# Patient Record
Sex: MALE | Race: WHITE
[De-identification: names, ages, dates, MRNs, and addresses within clinical notes are randomized per-mention and may not be internally consistent; named-entity substitution may affect disease eponyms.]

---

## 2022-02-14 ENCOUNTER — HOSPITAL ENCOUNTER (OUTPATIENT)
Dept: HOSPITAL 75 - PREOP | Age: 47
Discharge: HOME | End: 2022-02-14
Attending: SURGERY
Payer: COMMERCIAL

## 2022-02-14 VITALS — WEIGHT: 264.55 LBS | BODY MASS INDEX: 37.87 KG/M2 | HEIGHT: 70.08 IN

## 2022-02-14 DIAGNOSIS — Z01.818: Primary | ICD-10-CM

## 2022-02-21 ENCOUNTER — HOSPITAL ENCOUNTER (OUTPATIENT)
Dept: HOSPITAL 75 - ENDO | Age: 47
Discharge: HOME | End: 2022-02-21
Attending: SURGERY
Payer: COMMERCIAL

## 2022-02-21 VITALS — SYSTOLIC BLOOD PRESSURE: 133 MMHG | DIASTOLIC BLOOD PRESSURE: 93 MMHG

## 2022-02-21 VITALS — SYSTOLIC BLOOD PRESSURE: 123 MMHG | DIASTOLIC BLOOD PRESSURE: 72 MMHG

## 2022-02-21 VITALS — DIASTOLIC BLOOD PRESSURE: 87 MMHG | SYSTOLIC BLOOD PRESSURE: 121 MMHG

## 2022-02-21 VITALS — DIASTOLIC BLOOD PRESSURE: 103 MMHG | SYSTOLIC BLOOD PRESSURE: 144 MMHG

## 2022-02-21 VITALS — HEIGHT: 70.08 IN | WEIGHT: 264.55 LBS | BODY MASS INDEX: 37.87 KG/M2

## 2022-02-21 VITALS — SYSTOLIC BLOOD PRESSURE: 129 MMHG | DIASTOLIC BLOOD PRESSURE: 76 MMHG

## 2022-02-21 VITALS — SYSTOLIC BLOOD PRESSURE: 121 MMHG | DIASTOLIC BLOOD PRESSURE: 87 MMHG

## 2022-02-21 DIAGNOSIS — Z90.49: ICD-10-CM

## 2022-02-21 DIAGNOSIS — K52.9: ICD-10-CM

## 2022-02-21 DIAGNOSIS — K63.5: ICD-10-CM

## 2022-02-21 DIAGNOSIS — E66.01: ICD-10-CM

## 2022-02-21 DIAGNOSIS — Z12.11: Primary | ICD-10-CM

## 2022-02-21 DIAGNOSIS — K64.8: ICD-10-CM

## 2022-02-21 PROCEDURE — 88305 TISSUE EXAM BY PATHOLOGIST: CPT

## 2022-02-21 NOTE — ANESTHESIA-GENERAL POST-OP
MAC


Patient Condition


Mental Status/LOC:  Same as Preop


Cardiovascular:  Satisfactory


Nausea/Vomiting:  Absent


Respiratory:  Satisfactory


Pain:  Controlled


Complications:  Absent





Post Op Complications


Complications


None





Follow Up Care/Instructions


Patient Instructions


None needed.





Anesthesiology Discharge Order


Discharge Order


Patient is doing well, no complaints, stable vital signs, no apparent adverse 

anesthesia problems.   


No complications reported per nursing.











KORIN HOANG CRNA         Feb 21, 2022 12:36

## 2022-02-21 NOTE — PROGRESS NOTE-PRE OPERATIVE
Pre-Operative Progress Note


H&P Reviewed


The H&P was reviewed, patient examined and no changes noted.


Time Seen by Provider:  08:17


Date H&P Reviewed:  Feb 21, 2022


Time H&P Reviewed:  08:17


Pre-Operative Diagnosis:  Screening colonoscopy











HOOD ANDREWS DO               Feb 21, 2022 08:22

## 2022-02-21 NOTE — ENDOSCOPY DISCHARGE INSTRUCT
Endo Procedure/Findings


Findings


1.:  Polyp


2.:  Internal Hemorrhoids





Discharge Instructions


-


Activity: You might feel a little sleepy until tomorrow.  This is due to the 

medicine you received to relax you.





Until tomorrow, you should:  


   NOT drive a car, operate machinery or power tools.


   NOT drink any alcoholic beverages.


   NOT make any important decisions or sign importortant papers.





Do not return to work until tomorrow, unless otherwise instructed. Resume 

previous activities tomorrow.





Diet: Start by taking liquids.  If you tolerate liquids, advance to solid food.


1.:  Colonscopy in 5 years





Notify Physician


-


If you experience excessive bleeding, unusual abdominal pain, fever, or chest 

pain, contact your doctor immediately.











HOOD ANDREWS DO               Feb 21, 2022 10:21

## 2022-02-21 NOTE — PROGRESS NOTE-POST OPERATIVE
Post-Operative Progess Note


Surgeon (s)/Assistant (s)


Surgeon


HOOD ANDREWS DO


Assistant:  HIGINIO Haile





Pre-Operative Diagnosis


Screening colonoscopy





Post-Operative Diagnosis





Polyp


int hemorrhoids





Procedure & Operative Findings


Date of Procedure


2/21/22


Procedure Performed/Findings


Colon with hot bx





PROCEDURE NOTE:


After informed consent was obtained, the patient was brought to the endoscopy


suite, placed in bed in left lateral decubitus position.  He  was administered


IV sedation by the CRNA who then monitored his vitals the entire time, heart


rate, blood pressure and pulse ox and the scope was inserted, pushed all the way


to about 150 cm and pushed into the cecum.  I took a picture of appendiceal 

orifice 


and noted the ileo-cecal valve.  Then slowly withdrew the scope insufflating to 

look


circumferentially at the walls starting in the cecum, up the ascending colon to 

the 


hepatic flexure, then down the transverse colon to the splenic flexure and  into

the 


descending colon.  Next down in  the sigmoid colon, I found a small polyp and


elected to do a hot biopsy to remove it.  Finally into the rectal vault and 

retroflexed 


the scope. Took a picture of the internal hemorrhoids.  





The patient tolerated the procedure.  He was recovered in endoscopy suite.


Anesthesia Type


IV sedation by CRNA





Estimated Blood Loss


Estimated blood loss (mL):  scant





Specimens/Packing


Specimens Removed


simgoid polyp











HOOD ANDREWS DO               Feb 21, 2022 10:21

## 2022-11-07 ENCOUNTER — HOSPITAL ENCOUNTER (EMERGENCY)
Dept: HOSPITAL 75 - ER | Age: 47
Discharge: HOME | End: 2022-11-07
Payer: COMMERCIAL

## 2022-11-07 VITALS — SYSTOLIC BLOOD PRESSURE: 135 MMHG | DIASTOLIC BLOOD PRESSURE: 94 MMHG

## 2022-11-07 VITALS — HEIGHT: 70 IN | WEIGHT: 261.91 LBS | BODY MASS INDEX: 37.5 KG/M2

## 2022-11-07 DIAGNOSIS — R10.31: Primary | ICD-10-CM

## 2022-11-07 DIAGNOSIS — Z90.49: ICD-10-CM

## 2022-11-07 DIAGNOSIS — R31.9: ICD-10-CM

## 2022-11-07 LAB
ALBUMIN SERPL-MCNC: 4.6 GM/DL (ref 3.2–4.5)
ALP SERPL-CCNC: 87 U/L (ref 40–136)
ALT SERPL-CCNC: 33 U/L (ref 0–55)
APTT PPP: YELLOW S
BACTERIA #/AREA URNS HPF: NEGATIVE /HPF
BASOPHILS # BLD AUTO: 0.1 10^3/UL (ref 0–0.1)
BASOPHILS NFR BLD AUTO: 1 % (ref 0–10)
BILIRUB SERPL-MCNC: 0.5 MG/DL (ref 0.1–1)
BILIRUB UR QL STRIP: NEGATIVE
BUN/CREAT SERPL: 11
CALCIUM SERPL-MCNC: 10 MG/DL (ref 8.5–10.1)
CHLORIDE SERPL-SCNC: 104 MMOL/L (ref 98–107)
CO2 SERPL-SCNC: 21 MMOL/L (ref 21–32)
CREAT SERPL-MCNC: 1.04 MG/DL (ref 0.6–1.3)
EOSINOPHIL # BLD AUTO: 0.1 10^3/UL (ref 0–0.3)
EOSINOPHIL NFR BLD AUTO: 1 % (ref 0–10)
FIBRINOGEN PPP-MCNC: (no result) MG/DL
GFR SERPLBLD BASED ON 1.73 SQ M-ARVRAT: 90 ML/MIN
GLUCOSE SERPL-MCNC: 103 MG/DL (ref 70–105)
GLUCOSE UR STRIP-MCNC: NEGATIVE MG/DL
HCT VFR BLD CALC: 43 % (ref 40–54)
HGB BLD-MCNC: 14.1 G/DL (ref 13.3–17.7)
KETONES UR QL STRIP: NEGATIVE
LEUKOCYTE ESTERASE UR QL STRIP: NEGATIVE
LIPASE SERPL-CCNC: 53 U/L (ref 8–78)
LYMPHOCYTES # BLD AUTO: 4.2 10^3/UL (ref 1–4)
LYMPHOCYTES NFR BLD AUTO: 35 % (ref 12–44)
MAGNESIUM SERPL-MCNC: 2 MG/DL (ref 1.6–2.4)
MANUAL DIFFERENTIAL PERFORMED BLD QL: NO
MCH RBC QN AUTO: 29 PG (ref 25–34)
MCHC RBC AUTO-ENTMCNC: 33 G/DL (ref 32–36)
MCV RBC AUTO: 87 FL (ref 80–99)
MONOCYTES # BLD AUTO: 1 10^3/UL (ref 0–1)
MONOCYTES NFR BLD AUTO: 8 % (ref 0–12)
NEUTROPHILS # BLD AUTO: 6.5 10^3/UL (ref 1.8–7.8)
NEUTROPHILS NFR BLD AUTO: 55 % (ref 42–75)
NITRITE UR QL STRIP: NEGATIVE
PH UR STRIP: 5.5 [PH] (ref 5–9)
PLATELET # BLD: 348 10^3/UL (ref 130–400)
PMV BLD AUTO: 10.9 FL (ref 9–12.2)
POTASSIUM SERPL-SCNC: 3.7 MMOL/L (ref 3.6–5)
PROT SERPL-MCNC: 7.8 GM/DL (ref 6.4–8.2)
PROT UR QL STRIP: (no result)
RBC #/AREA URNS HPF: (no result) /HPF
RENAL EPI CELLS #/AREA URNS HPF: (no result) /HPF
SODIUM SERPL-SCNC: 141 MMOL/L (ref 135–145)
SP GR UR STRIP: >=1.03 (ref 1.02–1.02)
SQUAMOUS #/AREA URNS HPF: (no result) /HPF
WBC # BLD AUTO: 11.9 10^3/UL (ref 4.3–11)
WBC #/AREA URNS HPF: (no result) /HPF

## 2022-11-07 PROCEDURE — 83735 ASSAY OF MAGNESIUM: CPT

## 2022-11-07 PROCEDURE — 85025 COMPLETE CBC W/AUTO DIFF WBC: CPT

## 2022-11-07 PROCEDURE — 36415 COLL VENOUS BLD VENIPUNCTURE: CPT

## 2022-11-07 PROCEDURE — 74177 CT ABD & PELVIS W/CONTRAST: CPT

## 2022-11-07 PROCEDURE — 80053 COMPREHEN METABOLIC PANEL: CPT

## 2022-11-07 PROCEDURE — 99283 EMERGENCY DEPT VISIT LOW MDM: CPT

## 2022-11-07 PROCEDURE — 83690 ASSAY OF LIPASE: CPT

## 2022-11-07 PROCEDURE — 81000 URINALYSIS NONAUTO W/SCOPE: CPT

## 2022-11-07 NOTE — ED ABDOMINAL PAIN
General


Chief Complaint:  Abdominal/GI Problems


Stated Complaint:  ABD PAIN


Nursing Triage Note:  


PT TO RM 2 BY WC WITH COMPLAINT OF RLQ PAIN THAT STARTED 2 HOURS AGO.


Source of Information:  Patient


Exam Limitations:  No Limitations





History of Present Illness


Date Seen by Provider:  2022


Time Seen by Provider:  18:15


Initial Comments


47-year-old male with no pertinent past medical history coming in due to right 

lower quadrant abdominal pain.  Started 2 hours prior to arrival, was slow in 

onset, sharp, now severe.  Nothing seems to make it better or worse, and its 

constant.  Has some nausea but no vomiting with it.  He states it feels similar 

to kidney stone pain he had many years ago, except that was in his flank.  

Denies any fever, vomiting, diarrhea, dysuria, hematuria, chest pain, shortness 

of breath, rash, or any other concerns.





Allergies and Home Medications


Allergies


Coded Allergies:  


     No Known Drug Allergies (Unverified , 22)





Patient Home Medication List


Home Medication List Reviewed:  Yes


Diphenhydramine HCl (Benadryl) 25 Mg Capsule, 25 MG PO HS, (Reported)


   Entered as Reported by: AURELIA KOWALSKI on 22 124


Fluoxetine HCl (Fluoxetine Dr) 90 Mg Capsule.dr, 90 MG PO DAILY, (Reported)


   Entered as Reported by: AURELIA KOWALSKI on 22 124


Melatonin (Melatonin) 10 Mg Capsule, 10 MG PO HS, (Reported)


   Entered as Reported by: AURELIA KOWALSKI on 22


Methylphenidate HCl (Ritalin) 20 Mg Tablet, 20 MG PO DAILY, (Reported)


   Entered as Reported by: AURELIA KOWALSKI on 22 124


Simvastatin (Simvastatin) 20 Mg Tablet, 20 MG PO DAILY, (Reported)


   Entered as Reported by: AURELIA KOWALSKI on 22 1247





Review of Systems


Review of Systems


Constitutional:  No fever


EENTM:  No Symptoms Reported


Respiratory:  No Symptoms Reported


Cardiovascular:  No Symptoms Reported


Gastrointestinal:  See HPI


Genitourinary:  No Symptoms Reported


Musculoskeletal:  no symptoms reported


Skin:  no symptoms reported


Psychiatric/Neurological:  No Symptoms Reported


Endocrine:  No Symptoms Reported


Hematologic/Lymphatic:  No Symptoms Reported





All Other Systems Reviewed


Negative Unless Noted:  Yes





Past Medical-Social-Family Hx


Patient Social History


Tobacco Use?:  No


Use of E-Cig and/or Vaping dev:  No


Substance use?:  No


Alcohol Use?:  No


Pt feels they are or have been:  No





Immunizations Up To Date


First/Initial COVID19 Vaccinat:  NONE


Second COVID19 Vaccination Cristopher:  NONE


Third COVID19 Vaccination Date:  NONE





Seasonal Allergies


Seasonal Allergies:  Yes





Past Medical History


Surgeries:  Yes


Gallbladder, Tonsillectomy


Respiratory:  No


High Cholesterol


Neurological:  Yes (CERVICAL DISKS)


Gastrointestinal:  Yes (GALL BLADDER REMOVED)


Arthritis


Endocrine:  No


HEENT:  No


Cancer:  No


Psychosocial:  Yes


ADD/ADHD, Depression


Integumentary:  No


Blood Disorders:  No


Adverse Reaction/Blood Tranf:  No





Physical Exam


Vital Signs





Vital Signs - First Documented








 22





 18:15


 


Temp 36.8


 


Pulse 99


 


Resp 16


 


B/P (MAP) 175/125 (142)


 


Pulse Ox 98


 


O2 Delivery Room Air





Capillary Refill : Less Than 3 Seconds


Height/Weight/BMI


Height: '"


Weight: lbs. oz. kg; 37.00 BMI


Method:


General Appearance:  WD/WN, mild distress


HEENT:  PERRL/EOMI, normal ENT inspection, pharynx normal


Neck:  non-tender, full range of motion, supple, normal inspection


Respiratory:  chest non-tender, lungs clear, normal breath sounds, no 

respiratory distress, no accessory muscle use


Cardiovascular:  regular rate, rhythm, no edema, no murmur


Gastrointestinal:  normal bowel sounds, soft; No distended, No guarding, No 

rebound; tenderness


Extremities:  normal range of motion, non-tender, normal inspection, no pedal 

edema, no calf tenderness, normal capillary refill


Back:  normal inspection, no CVA tenderness, no vertebral tenderness; No CVA 

tenderness (R)


Neurologic/Psychiatric:  no motor/sensory deficits, alert, normal mood/affect


Skin:  normal color, warm/dry


Lymphatic:  no adenopathy





Progress/Results/Core Measures


Results/Orders


Lab Results





Laboratory Tests








Test


 22


18:15 22


18:50 Range/Units


 


 


White Blood Count


 11.9 H


 


 4.3-11.0


10^3/uL


 


Red Blood Count


 4.94 


 


 4.30-5.52


10^6/uL


 


Hemoglobin 14.1   13.3-17.7  g/dL


 


Hematocrit 43   40-54  %


 


Mean Corpuscular Volume 87   80-99  fL


 


Mean Corpuscular Hemoglobin 29   25-34  pg


 


Mean Corpuscular Hemoglobin


Concent 33 


 


 32-36  g/dL





 


Red Cell Distribution Width 13.0   10.0-14.5  %


 


Platelet Count


 348 


 


 130-400


10^3/uL


 


Mean Platelet Volume 10.9   9.0-12.2  fL


 


Immature Granulocyte % (Auto) 0    %


 


Neutrophils (%) (Auto) 55   42-75  %


 


Lymphocytes (%) (Auto) 35   12-44  %


 


Monocytes (%) (Auto) 8   0-12  %


 


Eosinophils (%) (Auto) 1   0-10  %


 


Basophils (%) (Auto) 1   0-10  %


 


Neutrophils # (Auto)


 6.5 


 


 1.8-7.8


10^3/uL


 


Lymphocytes # (Auto)


 4.2 H


 


 1.0-4.0


10^3/uL


 


Monocytes # (Auto)


 1.0 


 


 0.0-1.0


10^3/uL


 


Eosinophils # (Auto)


 0.1 


 


 0.0-0.3


10^3/uL


 


Basophils # (Auto)


 0.1 


 


 0.0-0.1


10^3/uL


 


Immature Granulocyte # (Auto)


 0.0 


 


 0.0-0.1


10^3/uL


 


Sodium Level 141   135-145  MMOL/L


 


Potassium Level 3.7   3.6-5.0  MMOL/L


 


Chloride Level 104     MMOL/L


 


Carbon Dioxide Level 21   21-32  MMOL/L


 


Anion Gap 16 H  5-14  MMOL/L


 


Blood Urea Nitrogen 11   7-18  MG/DL


 


Creatinine


 1.04 


 


 0.60-1.30


MG/DL


 


Estimat Glomerular Filtration


Rate 90 


 


  





 


BUN/Creatinine Ratio 11    


 


Glucose Level 103     MG/DL


 


Calcium Level 10.0   8.5-10.1  MG/DL


 


Corrected Calcium    8.5-10.1  MG/DL


 


Magnesium Level 2.0   1.6-2.4  MG/DL


 


Total Bilirubin 0.5   0.1-1.0  MG/DL


 


Aspartate Amino Transf


(AST/SGOT) 30 


 


 5-34  U/L





 


Alanine Aminotransferase


(ALT/SGPT) 33 


 


 0-55  U/L





 


Alkaline Phosphatase 87     U/L


 


Total Protein 7.8   6.4-8.2  GM/DL


 


Albumin 4.6 H  3.2-4.5  GM/DL


 


Lipase 53   8-78  U/L


 


Urine Color  YELLOW   


 


Urine Clarity  CLOUDY   


 


Urine pH  5.5  5-9  


 


Urine Specific Gravity  >=1.030  1.016-1.022  


 


Urine Protein  TRACE H NEGATIVE  


 


Urine Glucose (UA)  NEGATIVE  NEGATIVE  


 


Urine Ketones  NEGATIVE  NEGATIVE  


 


Urine Nitrite  NEGATIVE  NEGATIVE  


 


Urine Bilirubin  NEGATIVE  NEGATIVE  


 


Urine Urobilinogen  0.2  < = 1.0  MG/DL


 


Urine Leukocyte Esterase  NEGATIVE  NEGATIVE  


 


Urine RBC (Auto)  3+ H NEGATIVE  


 


Urine RBC   H  /HPF


 


Urine WBC  NONE   /HPF


 


Urine Squamous Epithelial


Cells 


 NONE 


  /HPF





 


Urine Renal Epithelial Cells  NONE   /HPF


 


Urine Crystals  NONE   /LPF


 


Urine Bacteria  NEGATIVE   /HPF


 


Urine Casts  NONE   /LPF


 


Urine Mucus  SMALL H  /LPF


 


Urine Culture Indicated  NO   








My Orders





Orders - BRONSON ASHRAF MD


Cbc With Automated Diff (22 18:30)


Comprehensive Metabolic Panel (22 18:30)


Lipase (22 18:30)


Magnesium (22 18:30)


Ua Culture If Indicated (22 18:30)


Ct Abd/Pelv W (Appendicitis) (22 18:30)


Ed Iv/Invasive Line Start (22 18:30)


Morphine  Injection (Morphine  Injection (22 18:30)


Ketorolac Injection (Toradol Injection) (22 18:30)


Ondansetron Injection (Zofran Injectio (22 18:30)


Iohexol Injection (Omnipaque 350 Mg/Ml 1 (22 18:45)


Ns (Ivpb) (Sodium Chloride 0.9% Ivpb Bag (22 18:45)





Medications Given in ED





Current Medications








 Medications  Dose


 Ordered  Sig/Reilly


 Route  Start Time


 Stop Time Status Last Admin


Dose Admin


 


 Iohexol  100 ml  ONCE  ONCE


 IV  22 18:45


 22 18:46 DC 22 19:08


100 ML


 


 Ketorolac


 Tromethamine  15 mg  ONCE  ONCE


 IVP  22 18:30


 22 18:32 DC 22 18:46


15 MG


 


 Ondansetron HCl  4 mg  ONCE  ONCE


 IVP  22 18:30


 22 18:32 DC 22 18:46


4 MG


 


 Sodium Chloride  100 ml  ONCE  ONCE


 IV  22 18:45


 22 18:46 DC 22 19:08


80 ML








Vital Signs/I&O











 22





 18:15


 


Temp 36.8


 


Pulse 99


 


Resp 16


 


B/P (MAP) 175/125 (142)


 


Pulse Ox 98


 


O2 Delivery Room Air














Blood Pressure Mean:                    142











Progress


Progress Note :  


Progress Note


47-year-old male with above history coming in due to right lower quadrant 

abdominal pain.  ABCs were intact and vitals were stable on presentation.  He 

was in severe pain on presentation, so an IV was placed and he was given 

morphine, Toradol, and Zofran.  Between my examination of the patient, and going

for CT scan, he urinated.  Immediately after that, his pain completely went 

away.  His urine did have blood in it.  CT negative for any acute findings.  I 

suspect the patient had a kidney stone and passed it just before his CT scan as 

he is completely back to his baseline.  I believe he is stable for discharge 

with outpatient follow-up.  He was sent home with strict return precautions.





Diagnostic Imaging





   Diagonstic Imaging:  CT


   Plain Films/CT/US/NM/MRI:  abdomen, pelvis


Comments


NAME:   DALIA EDWARDS


[facility] REC#:   [id number]


ACCOUNT#:   [id number]


PT STATUS:   REG ER


:   1975


PHYSICIAN:   BRONSON ASHRAF MD


ADMIT DATE:   22/ER


                                  ***Signed***


Date of Exam:22





CT ABD/PELV W (APPENDICITIS)








PROCEDURE: CT abdomen and pelvis with contrast, rule out


appendicitis.





TECHNIQUE: Multiple contiguous axial images were obtained through


the abdomen and pelvis after the administration of intravenous


contrast. All CT scans use one or more of the following dose


optimizing techniques: Automated exposure control, MA and/or KvP


adjustment based on patient size and exam type or iterative


reconstruction. 





INDICATION: Right lower quadrant pain.





FINDINGS: The lung bases are clear. Liver appears normal.


Gallbladder is surgically absent. Pancreas is normal. Spleen is


not enlarged. The kidneys and adrenals appear normal. The


appendix is normal. Colon is unremarkable. Urinary bladder is


normal. Small bowel is not dilated. There is no intraperitoneal


free air or free fluid.





IMPRESSION: No acute abnormality is seen in the abdomen or


pelvis. No evidence for appendicitis.





Dictated by: 





  Dictated on workstation # [id number]








Dict:   22


Trans:   22


 [id number]





Interpreted by:     GARTH STEELE MD


Electronically signed by: GARTH STEELE 





Departure


Impression





   Primary Impression:  


   RLQ abdominal pain


   Additional Impression:  


   Hematuria


   Qualified Codes:  R31.29 - Other microscopic hematuria


Disposition:  01 HOME, SELF-CARE


Condition:  Improved





Departure-Patient Inst.


Decision time for Depature:  19:34


Referrals:  


NO,LOCAL PHYSICIAN (PCP/Family)


Primary Care Physician


Patient Instructions:  Blood in Urine (Hematuria), Adult ED





Add. Discharge Instructions:  


I suspect you had a kidney stone that you passed while here just before you went

to CT scan.  Your CT scan was normal, but you did have blood in your urine.  If 

pain comes back, there are medicines at your pharmacy.  I do want you to have a 

repeat urinalysis sometime in the next couple of months to be sure the blood 

goes away.  Your primary physician can order this.


Scripts


Ondansetron (Ondansetron Odt) 4 Mg Tab.rapdis


4 MG SL Q6H PRN for NAUSEA/VOMITING for 5 Days, #20 TAB


   Prov: BRONSON ASHRAF MD         22 


Ketorolac Tromethamine (Ketorolac Tromethamine) 10 Mg Tablet


10 MG PO Q6H for 3 Days, #12 TAB


   Prov: BRONSON ASHRAF MD         22


Work/School Note:  Work Release Form   Date Seen in the Emergency Department:  

2022


   Return to Work:  2022


   Restrictions:  No Restrictions











BRONSON ASHRAF MD           2022 18:34

## 2022-11-07 NOTE — DIAGNOSTIC IMAGING REPORT
PROCEDURE: CT abdomen and pelvis with contrast, rule out

appendicitis.



TECHNIQUE: Multiple contiguous axial images were obtained through

the abdomen and pelvis after the administration of intravenous

contrast. All CT scans use one or more of the following dose

optimizing techniques: Automated exposure control, MA and/or KvP

adjustment based on patient size and exam type or iterative

reconstruction. 



INDICATION: Right lower quadrant pain.



FINDINGS: The lung bases are clear. Liver appears normal.

Gallbladder is surgically absent. Pancreas is normal. Spleen is

not enlarged. The kidneys and adrenals appear normal. The

appendix is normal. Colon is unremarkable. Urinary bladder is

normal. Small bowel is not dilated. There is no intraperitoneal

free air or free fluid.



IMPRESSION: No acute abnormality is seen in the abdomen or

pelvis. No evidence for appendicitis.



Dictated by: 



  Dictated on workstation # [id number]

## 2022-11-17 ENCOUNTER — HOSPITAL ENCOUNTER (OUTPATIENT)
Dept: HOSPITAL 75 - PREOP | Age: 47
Discharge: HOME | End: 2022-11-17
Attending: OTOLARYNGOLOGY
Payer: COMMERCIAL

## 2022-11-17 VITALS — HEIGHT: 70 IN | BODY MASS INDEX: 38.73 KG/M2 | WEIGHT: 270.51 LBS

## 2022-11-17 DIAGNOSIS — Z01.818: Primary | ICD-10-CM

## 2022-11-25 ENCOUNTER — HOSPITAL ENCOUNTER (OUTPATIENT)
Dept: HOSPITAL 75 - SDC | Age: 47
Discharge: HOME | End: 2022-11-25
Attending: OTOLARYNGOLOGY
Payer: COMMERCIAL

## 2022-11-25 VITALS — SYSTOLIC BLOOD PRESSURE: 127 MMHG | DIASTOLIC BLOOD PRESSURE: 82 MMHG

## 2022-11-25 VITALS — SYSTOLIC BLOOD PRESSURE: 120 MMHG | DIASTOLIC BLOOD PRESSURE: 78 MMHG

## 2022-11-25 VITALS — SYSTOLIC BLOOD PRESSURE: 112 MMHG | DIASTOLIC BLOOD PRESSURE: 77 MMHG

## 2022-11-25 VITALS — DIASTOLIC BLOOD PRESSURE: 92 MMHG | SYSTOLIC BLOOD PRESSURE: 133 MMHG

## 2022-11-25 VITALS — SYSTOLIC BLOOD PRESSURE: 130 MMHG | DIASTOLIC BLOOD PRESSURE: 91 MMHG

## 2022-11-25 VITALS — DIASTOLIC BLOOD PRESSURE: 91 MMHG | SYSTOLIC BLOOD PRESSURE: 131 MMHG

## 2022-11-25 VITALS — DIASTOLIC BLOOD PRESSURE: 89 MMHG | SYSTOLIC BLOOD PRESSURE: 127 MMHG

## 2022-11-25 VITALS — SYSTOLIC BLOOD PRESSURE: 125 MMHG | DIASTOLIC BLOOD PRESSURE: 83 MMHG

## 2022-11-25 VITALS — SYSTOLIC BLOOD PRESSURE: 128 MMHG | DIASTOLIC BLOOD PRESSURE: 88 MMHG

## 2022-11-25 VITALS — BODY MASS INDEX: 38.73 KG/M2 | WEIGHT: 270.51 LBS | HEIGHT: 70 IN

## 2022-11-25 VITALS — SYSTOLIC BLOOD PRESSURE: 119 MMHG | DIASTOLIC BLOOD PRESSURE: 84 MMHG

## 2022-11-25 DIAGNOSIS — J34.89: Primary | ICD-10-CM

## 2022-11-25 DIAGNOSIS — E66.01: ICD-10-CM

## 2022-11-25 LAB
ALBUMIN SERPL-MCNC: 4.4 GM/DL (ref 3.2–4.5)
ALP SERPL-CCNC: 92 U/L (ref 40–136)
ALT SERPL-CCNC: 43 U/L (ref 0–55)
BASOPHILS # BLD AUTO: 0.1 10^3/UL (ref 0–0.1)
BASOPHILS NFR BLD AUTO: 1 % (ref 0–10)
BILIRUB SERPL-MCNC: 0.5 MG/DL (ref 0.1–1)
BUN/CREAT SERPL: 13
CALCIUM SERPL-MCNC: 9.2 MG/DL (ref 8.5–10.1)
CHLORIDE SERPL-SCNC: 104 MMOL/L (ref 98–107)
CO2 SERPL-SCNC: 25 MMOL/L (ref 21–32)
CREAT SERPL-MCNC: 0.82 MG/DL (ref 0.6–1.3)
EOSINOPHIL # BLD AUTO: 0.2 10^3/UL (ref 0–0.3)
EOSINOPHIL NFR BLD AUTO: 2 % (ref 0–10)
GFR SERPLBLD BASED ON 1.73 SQ M-ARVRAT: 110 ML/MIN
GLUCOSE SERPL-MCNC: 91 MG/DL (ref 70–105)
HCT VFR BLD CALC: 42 % (ref 40–54)
HGB BLD-MCNC: 14 G/DL (ref 13.3–17.7)
LYMPHOCYTES # BLD AUTO: 2.7 10^3/UL (ref 1–4)
LYMPHOCYTES NFR BLD AUTO: 30 % (ref 12–44)
MANUAL DIFFERENTIAL PERFORMED BLD QL: NO
MCH RBC QN AUTO: 29 PG (ref 25–34)
MCHC RBC AUTO-ENTMCNC: 33 G/DL (ref 32–36)
MCV RBC AUTO: 87 FL (ref 80–99)
MONOCYTES # BLD AUTO: 0.8 10^3/UL (ref 0–1)
MONOCYTES NFR BLD AUTO: 9 % (ref 0–12)
NEUTROPHILS # BLD AUTO: 5.3 10^3/UL (ref 1.8–7.8)
NEUTROPHILS NFR BLD AUTO: 59 % (ref 42–75)
PLATELET # BLD: 308 10^3/UL (ref 130–400)
PMV BLD AUTO: 10.2 FL (ref 9–12.2)
POTASSIUM SERPL-SCNC: 3.7 MMOL/L (ref 3.6–5)
PROT SERPL-MCNC: 7.6 GM/DL (ref 6.4–8.2)
SODIUM SERPL-SCNC: 140 MMOL/L (ref 135–145)
WBC # BLD AUTO: 9 10^3/UL (ref 4.3–11)

## 2022-11-25 PROCEDURE — 80053 COMPREHEN METABOLIC PANEL: CPT

## 2022-11-25 PROCEDURE — 36415 COLL VENOUS BLD VENIPUNCTURE: CPT

## 2022-11-25 PROCEDURE — 85025 COMPLETE CBC W/AUTO DIFF WBC: CPT

## 2022-11-25 NOTE — ANESTHESIA-GENERAL POST-OP
General


Patient Condition


Mental Status/LOC:  Same as Preop


Cardiovascular:  Satisfactory


Nausea/Vomiting:  Absent


Respiratory:  Satisfactory


Pain:  Controlled


Complications:  Absent





Post Op Complications


Complications


None





Follow Up Care/Instructions


Patient Instructions


None needed.





Anesthesia/Patient Condition


Patient Condition


Patient is doing well, no complaints, stable vital signs, no apparent adverse 

anesthesia problems.   


No complications reported per nursing.











STEVE GODINEZ CRNA              Nov 25, 2022 09:02

## 2022-11-25 NOTE — PROGRESS NOTE-POST OPERATIVE
Post-Operative Progess Note


Surgeon (s)/Assistant (s)


Surgeon


HOWARD SOSA MD


Assistant


n/a





Pre-Operative Diagnosis


Nasoseptal Perforation





Post-Operative Diagnosis


same





Post-Op Procedure Note


Date of Procedure:  Nov 25, 2022


Name of Procedure Performed:  


Insertion of Nasoseptal Button


Description & Findings


Description and Findings:





n/a


Anesthesia Type


lma


Estimated Blood Loss


minimal


Packing


none.


Specimen(s) collected/removed


none











HOWARD SOSA MD             Nov 25, 2022 07:18

## 2022-11-25 NOTE — PROGRESS NOTE-PRE OPERATIVE
Pre-Operative Progress Note


Date of Available H&P:  Nov 25, 2022


Date H&P Reviewed:  Nov 25, 2022


Time H&P Reviewed:  06:30


History & Physical:  H&P Reviewed, Patient Examed, No changes noted


Changes from last HP


none


Pre-Operative Diagnosis:  Nasoseptal Perforation











HOWARD SOSA MD             Nov 25, 2022 07:18